# Patient Record
Sex: FEMALE | Race: WHITE | URBAN - METROPOLITAN AREA
[De-identification: names, ages, dates, MRNs, and addresses within clinical notes are randomized per-mention and may not be internally consistent; named-entity substitution may affect disease eponyms.]

---

## 2021-04-13 ENCOUNTER — TELEPHONE (OUTPATIENT)
Dept: ENDOCRINOLOGY | Age: 66
End: 2021-04-13

## 2021-05-05 RX ORDER — SIMETHICONE 80 MG
TABLET,CHEWABLE ORAL
COMMUNITY
Start: 2021-03-22 | End: 2021-05-18 | Stop reason: CLARIF

## 2021-05-05 RX ORDER — BISACODYL 5 MG/1
TABLET, SUGAR COATED ORAL
COMMUNITY
Start: 2021-03-25

## 2021-05-05 RX ORDER — MECLIZINE HYDROCHLORIDE 25 MG/1
25 TABLET ORAL 3 TIMES DAILY PRN
COMMUNITY
Start: 2021-02-04

## 2021-05-05 RX ORDER — OMEPRAZOLE 40 MG/1
CAPSULE, DELAYED RELEASE ORAL
COMMUNITY
Start: 2021-03-22

## 2021-05-05 RX ORDER — CYANOCOBALAMIN (VITAMIN B-12) 1000 MCG
2 TABLET, EXTENDED RELEASE ORAL DAILY
COMMUNITY

## 2021-05-05 RX ORDER — FAMOTIDINE 20 MG/1
40 TABLET, FILM COATED ORAL NIGHTLY
COMMUNITY

## 2021-05-06 ENCOUNTER — TELEPHONE (OUTPATIENT)
Dept: ENDOCRINOLOGY | Age: 66
End: 2021-05-06

## 2021-05-18 ENCOUNTER — OFFICE VISIT (OUTPATIENT)
Dept: ENDOCRINOLOGY | Age: 66
End: 2021-05-18
Payer: MEDICARE

## 2021-05-18 VITALS
HEIGHT: 64 IN | HEART RATE: 75 BPM | BODY MASS INDEX: 27.39 KG/M2 | RESPIRATION RATE: 14 BRPM | TEMPERATURE: 97 F | OXYGEN SATURATION: 99 % | SYSTOLIC BLOOD PRESSURE: 133 MMHG | DIASTOLIC BLOOD PRESSURE: 72 MMHG | WEIGHT: 160.4 LBS

## 2021-05-18 DIAGNOSIS — E55.9 VITAMIN D DEFICIENCY: ICD-10-CM

## 2021-05-18 DIAGNOSIS — M81.0 OSTEOPOROSIS, POSTMENOPAUSAL: Primary | ICD-10-CM

## 2021-05-18 LAB — VITAMIN D 25-HYDROXY: 30.4 NG/ML

## 2021-05-18 PROCEDURE — G8427 DOCREV CUR MEDS BY ELIG CLIN: HCPCS | Performed by: INTERNAL MEDICINE

## 2021-05-18 PROCEDURE — 1090F PRES/ABSN URINE INCON ASSESS: CPT | Performed by: INTERNAL MEDICINE

## 2021-05-18 PROCEDURE — 3017F COLORECTAL CA SCREEN DOC REV: CPT | Performed by: INTERNAL MEDICINE

## 2021-05-18 PROCEDURE — 1123F ACP DISCUSS/DSCN MKR DOCD: CPT | Performed by: INTERNAL MEDICINE

## 2021-05-18 PROCEDURE — 99205 OFFICE O/P NEW HI 60 MIN: CPT | Performed by: INTERNAL MEDICINE

## 2021-05-18 PROCEDURE — G8417 CALC BMI ABV UP PARAM F/U: HCPCS | Performed by: INTERNAL MEDICINE

## 2021-05-18 PROCEDURE — 1036F TOBACCO NON-USER: CPT | Performed by: INTERNAL MEDICINE

## 2021-05-18 PROCEDURE — 4040F PNEUMOC VAC/ADMIN/RCVD: CPT | Performed by: INTERNAL MEDICINE

## 2021-05-18 PROCEDURE — G8400 PT W/DXA NO RESULTS DOC: HCPCS | Performed by: INTERNAL MEDICINE

## 2021-05-18 RX ORDER — DENOSUMAB 60 MG/ML
60 INJECTION SUBCUTANEOUS ONCE
Qty: 1 SYRINGE | Refills: 2 | Status: SHIPPED | OUTPATIENT
Start: 2021-05-18 | End: 2021-05-18

## 2021-05-18 RX ORDER — GREEN TEA/HOODIA GORDONII 315-12.5MG
CAPSULE ORAL DAILY
COMMUNITY

## 2021-05-18 NOTE — PROGRESS NOTES
[facility] ([facility]) Osteoporosis and [address]  [address] [facility], [address] [place] [address],[place] [address]  Phone [phone]  Fax [phone]    NAME:  Meghann Armenta  :  1955  CONSULT DATE:    2021  MOST RECENT VISIT:  2021  TODAYS DATE:  2021    Labs @ [place] 2020    CONSULTATION REQUESTED BY: Meredith Aase CNP, fax [phone]  OTHERS WHO NEED REPORTS: Perez Moore MD, fax [phone]    PROBLEMS. Osteoporosis by DXA 2021, T-scores -3.7 in the spine (L1-L2), Z-score -2.4, -2.7 in the left femoral neck    Family history of osteoporosis, mother (pelvis fx), maternal grandmother (hip fx), maternal aunt  Natural menopause early 46s  Vitamin D deficiency; desirable 25-OH D is 30-60 ng/mL    27 ng/mL 2017  Dyspepsia, GERD not fully controlled with medication    CURRENT MANAGEMENT FOR BONE HEALTH/OSTEOPOROSIS. Calcium, 300 mg from low calcium foods,  some yogurt, cheese  Limited use of calcium supplements due to constipation   diet MVI Ca+D other total    Calcium 300+     mg/d   Vitamin D    1000  IU/d   Exercise, walks daily usually 30-40 minutes  Pharmacologic therapy: none    PREVIOUS BONE-ACTIVE MEDICATIONS. none    OTHER CURRENT MEDICATIONS (SELECTED): omeprazole, meclizine  OTC MEDICATIONS (SELECTED): magnesium, probiotic, famotidine, docusate    CHIEF COMPLAINT. GERD. Often placed on PPIs    HISTORY OF PRESENT ILLNESS: See problem list for chronic/inactive conditions. Ms. Corey Carter is a 55-year-old woman who was found to have osteoporosis by DXA in 2021. FOR FULL DETAILS OF FAMILY HISTORY, PAST MEDICAL AND SURGICAL HISTORY, SOCIAL HISTORY, AND REVIEW OF SYSTEMS, SEE PATIENT QUESTIONNAIRE OF TODAYS DATE. FAMILY HISTORY. Relevant hx in problem list and/or HPI. Otherwise not contributory. PAST MEDICAL HISTORY. Noted in health history form. PAST SURGICAL HISTORY. Noted in health history form. SOCIAL HISTORY. Nonsmoker.   No excessive reviewed. ASSESSMENT. Bone density is lower than desirable and lower than expected for age, race and sex. Without effective treatment, fracture risk is high. DIAGNOSTIC PLANS. Blood tests: CBC, CMP, phosphorus, 25-OH vitamin D. In 2 weeks (or longer), on appropriate calcium intake,  24-hour urine for calcium, creatinine and sodium. I will be in touch with the patient to review lab results. THERAPEUTIC PLANS. Calcium, target 1200 mg daily; we discussed recommended calcium intake and the effects of excessive calcium intake from supplements. I provided a handout with information about how to calculate daily calcium intake. Advised to add calcium supplements if possible. Vitamin D, recommended amount to be determined after review of 25-OH D lab result. Exercise, Recommended weight-bearing exercise (walking or equivalent) 30-40 minutes per session, 3 or 4 sessions a week and resistance exercise. Advised to take care to avoid injury (high impact, falling, etc). We discussed avoiding activities that place compressive forces on the spine; specifically pushing, pulling, bending, lifting and twisting to help prevent vertebral fractures. Pharmacologic therapy, we discussed long-term treatment options for osteoporosis that have evidence for broad spectrum anti-fracture efficacy (reduce the risk of vertebral, hip and other nonvertebral fractures); alendronate (Fosamax), zoledronate (Reclast) and Prolia (denosumab). I explained dosing instructions, the mechanism of action, side effects (which are uncommon) and safety concerns (which are rare). I would also consider risedronate (Actonel) but it usually more expensive than alendronate. She is not a good candidate for oral medication due to GI problems. I recommended Prolia and she agreed. We will make the necessary arrangements. Return appointment with DXA in 1 year. Total time on the date the encounter was 60-74 minutes. Tee Bey MD,

## 2021-05-18 NOTE — LETTER
[address] [facility] and Osteoporosis  [facility] [address] [facility], [address] [place] [address],[place] [address]  Phone [phone]  Fax [phone]         May 18, 2021         Josefina Ruffin CNP  Fax [phone]                            Re:  Jose Dominguez,  1955    Dear MsKimberly Acosta: Thank you for asking me to see Jose Dominguez in consultation. As you know, Ms. Bethany Olivares is a 77 y.o. woman found to have osteoporosis 2021, T-scores -3.7 in the spine (L1-L2), -2.7 in the right femoral neck. We reviewed life-style issues (calcium, vitamin D and physical activity). I have ordered some diagnostic tests and will be back in touch when I have the results. Without effective treatment, fracture risk is high. We discussed long-term treatment options (alendronate, zoledronate, Prolia). I recommended denosumab SQ twice yearly (Prolia) and she agreed. We will make the necessary arrangements. Enclosed is a copy of my consultation note. Please let me know if you have any questions. Sincerely,    Husam Jose. Tabitha BIGGS     Encl.  Copy of consult note    CC:  Radha Castaneda MD, fax [phone]

## 2021-08-03 ENCOUNTER — OFFICE VISIT (OUTPATIENT)
Dept: ENDOCRINOLOGY | Age: 66
End: 2021-08-03
Payer: MEDICARE

## 2021-08-03 VITALS — DIASTOLIC BLOOD PRESSURE: 61 MMHG | BODY MASS INDEX: 28.94 KG/M2 | SYSTOLIC BLOOD PRESSURE: 125 MMHG | WEIGHT: 167 LBS

## 2021-08-03 DIAGNOSIS — E55.9 VITAMIN D DEFICIENCY: ICD-10-CM

## 2021-08-03 DIAGNOSIS — Z51.81 MEDICATION MONITORING ENCOUNTER: ICD-10-CM

## 2021-08-03 DIAGNOSIS — M81.0 OSTEOPOROSIS, POSTMENOPAUSAL: Primary | ICD-10-CM

## 2021-08-03 PROCEDURE — 1036F TOBACCO NON-USER: CPT | Performed by: INTERNAL MEDICINE

## 2021-08-03 PROCEDURE — 99215 OFFICE O/P EST HI 40 MIN: CPT | Performed by: INTERNAL MEDICINE

## 2021-08-03 PROCEDURE — G8400 PT W/DXA NO RESULTS DOC: HCPCS | Performed by: INTERNAL MEDICINE

## 2021-08-03 PROCEDURE — 3017F COLORECTAL CA SCREEN DOC REV: CPT | Performed by: INTERNAL MEDICINE

## 2021-08-03 PROCEDURE — G8417 CALC BMI ABV UP PARAM F/U: HCPCS | Performed by: INTERNAL MEDICINE

## 2021-08-03 PROCEDURE — G8427 DOCREV CUR MEDS BY ELIG CLIN: HCPCS | Performed by: INTERNAL MEDICINE

## 2021-08-03 PROCEDURE — 1090F PRES/ABSN URINE INCON ASSESS: CPT | Performed by: INTERNAL MEDICINE

## 2021-08-03 PROCEDURE — 4040F PNEUMOC VAC/ADMIN/RCVD: CPT | Performed by: INTERNAL MEDICINE

## 2021-08-03 PROCEDURE — 1123F ACP DISCUSS/DSCN MKR DOCD: CPT | Performed by: INTERNAL MEDICINE

## 2021-08-03 NOTE — PROGRESS NOTES
[facility] ([facility]) Osteoporosis and [address] [facility] [address] [facility], [address] [facility],[place] [address]  Phone [phone]  Fax [phone]    NAME:  [place] Patel  :  1955  CONSULT DATE:    2021  MOST RECENT VISIT:  2021  TODAYS DATE:  2021    Labs @ [place] 2020    PROBLEMS. Osteoporosis by DXA 2021, T-scores -3.7 in the spine (L1-L2), Z-score -2.4, -2.7 in the left femoral neck    Family history of osteoporosis, mother (pelvis fx), maternal grandmother (hip fx), maternal aunt  Natural menopause early 46s  Vitamin D deficiency; desirable 25-OH D is 30-60 ng/mL    27 ng/mL 2017    30 ng/mL 2019, advised to add vitamin D 2000 IU/d  Dyspepsia, GERD not fully controlled with medication    CURRENT MANAGEMENT FOR BONE HEALTH/OSTEOPOROSIS. Calcium, 300 mg from low calcium foods,  some yogurt, cheese  Limited use of calcium supplements due to constipation   diet MVI Ca+D other total    Calcium 300+     mg/d   Vitamin D    1000  IU/d   Exercise, walks daily usually 30-40 minutes  Pharmacologic therapy: none    PREVIOUS BONE-ACTIVE MEDICATIONS. none    OTHER CURRENT MEDICATIONS (SELECTED): omeprazole, meclizine  OTC MEDICATIONS (SELECTED): magnesium, probiotic, famotidine, docusate    CHIEF COMPLAINT. Here for f/u of osteoporosis, vitamin D deficiency, monitoring treatment. No new related signs or symptoms. INTERVAL HISTORY:  See problem list for chronic/inactive conditions. No falls, near-falls or fractures. Feels well overall.  2021 I recommended Prolia and she agreed. She has questions and reservations about Prolia. FOR FULL DETAILS OF FAMILY HISTORY, PAST MEDICAL AND SURGICAL HISTORY, SOCIAL HISTORY, AND REVIEW OF SYSTEMS, SEE PATIENT QUESTIONNAIRE OF TODAYS DATE. FAMILY HISTORY. Relevant hx in problem list and/or HPI. Otherwise not contributory. PAST MEDICAL HISTORY. Noted in health history form. PAST SURGICAL HISTORY.   Noted in health history form. SOCIAL HISTORY. Nonsmoker. No excessive intake of alcohol, caffeine or sodas. Lives with her . REVIEW OF SYSTEMS. Maximum adult height 65. No significant height loss. Usual weight 150#. Has gained 10# or more in the last year. PHYSICAL EXAMINATION. GENERAL. Well-nourished, well-developed, normally proportioned adult. MENTAL STATUS. Pleasant mood. Oriented to time, place, and person. ORAL. Teeth appear to be in good condition. MUSCULOSKELETAL. Spinal contours are normal.  No spine tenderness to palpation or percussion. Three finger spaces between ribs and pelvis. Gait steady without assistance. NEUROLOGICAL. Able to rise from chair without using arms. No apparent motor or sensory deficit. Coordination appears normal     BONE DENSITY. Most recent done at [facility] using [employer] & [employer] equipment. I deleted L3 and L4 due to T-score discrepancy. T-scores   Initial study: 04/05/2021* L1-L2 -3.7 right fem. neck -2.7     The table below shows bone mineral density (grams/cm2), the appropriate measure for comparing serial scans. A significant increase or decrease is based on precision studies done at our center according to the ISCD protocol with a least significant change of 0.030 g/cm2. PA spine Proximal Femur (right)   Date L1-L2 Fem. neck Trochanter Total hip   04/05/2021* 0.721 0.667 0.548 0.761   *Done with [employer] equipment. Labs: 12/20/2020 Ca 9.8 Cr 0.9. Imaging: DXA printouts reviewed. ASSESSMENT. Bone density is lower than desirable and lower than expected for age, race and sex. Without effective treatment, fracture risk is high. PLANS. We had a long discussion about Prolia and I think I reassured her. She thinks that starting with Reclast might be better and that would be fine, too. She is leaving tomorrow to be with her daughter who is dealing with health issues and will be away for a month.  When she returns, she will let me know what she decides to do.    Return appointment with DXA in 1 year. Time spent today: 40-54 minutes. Norma Lu MD, Director, [facility]) Osteoporosis and Bone Health Services    CC:  Arde[place] [address] [place] [address], fax [phone]  Tami Yanez MD, fax [phone]